# Patient Record
(demographics unavailable — no encounter records)

---

## 2025-03-14 NOTE — PHYSICAL EXAM
[Alert] : alert [Normal Voice/Communication] : normal voice/communication [Healthy Appearing] : healthy appearing [No Acute Distress] : no acute distress [Sclera] : the sclera and conjunctiva were normal [Hearing Threshold Finger Rub Not [place]] : hearing was normal [Normal Lips/Gums] : the lips and gums were normal [Oropharynx] : the oropharynx was normal [Normal Appearance] : the appearance of the neck was normal [No Neck Mass] : no neck mass was observed [No Respiratory Distress] : no respiratory distress [No Acc Muscle Use] : no accessory muscle use [Respiration, Rhythm And Depth] : normal respiratory rhythm and effort [Auscultation Breath Sounds / Voice Sounds] : lungs were clear to auscultation bilaterally [Heart Rate And Rhythm] : heart rate was normal and rhythm regular [Normal S1, S2] : normal S1 and S2 [Murmurs] : no murmurs [Bowel Sounds] : normal bowel sounds [Abdomen Tenderness] : non-tender [No Masses] : no abdominal mass palpated [Abdomen Soft] : soft [] : no hepatosplenomegaly [Oriented To Time, Place, And Person] : oriented to person, place, and time [de-identified] : Well-developed well-nourished large frame and build.  No acute distress. [FreeTextEntry1] : Anicteric sclera. [de-identified] : Moist mucosa.  Mallampati #3. [de-identified] : Supple neck.  Short neck.  No adenopathy. [de-identified] : Clear. [de-identified] : Without murmurs rubs or gallops. [de-identified] : No CCE. [de-identified] : Mass of abdomen.  No HSM.  No MTR.  Normal bowel sounds. [de-identified] : Deferred. [de-identified] : Normal. [de-identified] : Normal. [de-identified] : Normal.

## 2025-03-14 NOTE — REASON FOR VISIT
[Consultation] : a consultation visit [FreeTextEntry1] : Gas borborygmi frequent bowel movements.  Cramps in upper abdomen.

## 2025-03-14 NOTE — ASSESSMENT
[FreeTextEntry1] : Dyspepsia, gas cramps and bloating possible lactose intolerance.  Positive family history of ulcers in mother.  Possible H. pylori infestation.  Symptoms do not suggest active peptic ulcer disease.  Unlikely to be biliary colic.  Loose frequent stools.  Possible IBD.  More likely IBS. Plan urea breath test Sonogram abdomen to assess for fatty liver and rule out gallstones. FibroScan to assess for hepatic steatosis and fibrosis. Stool studies should clearance C. difficile, GI PCR, fecal calprotectin. Blood work for CBC, CMP,'s ESR, CRP, IBD panel, [facility].  TSH with reflex. Vague abdominal discomfort.  Unclear etiology. Recommend CAT scan abdomen pelvis with oral and IV contrast. Hold on endoscopic procedures at this juncture.  Morbid obesity increases the risk of elective endoscopic procedures. Office follow-up here in 3 months.

## 2025-05-22 NOTE — ASSESSMENT
[FreeTextEntry1] : Morbid obesity with modest transaminitis and fatty liver on multiple scans.  Direct imaging studies including CT and ultrasound did not find cirrhosis of the liver.  FibroScan assessment may be an overestimation. No evidence for autoimmune disease.  Evidence for viral hepatitis.  Suspect that the majority of findings in liver result of alcohol use disorder and obesity.  Patient is currently abstinent from alcohol for the past 3 months and feeling fine.  No withdrawal symptoms.  Encouraged to maintain sobriety. Excellent response thus far to the use of Zepbound.  36 pound weight loss over 10 weeks.  No significant medication side effects to date. Will continue same.  Advised patient to return here in 1 year.  Vies patient to repeat blood work in 11 months to consist of CBC, CMP, alpha-fetoprotein.  Scan to be repeated in 11 months.  Both requested.  Arrangements made.  Call for problems.

## 2025-05-22 NOTE — REASON FOR VISIT
[Follow-up] : a follow-up of an existing diagnosis [FreeTextEntry1] : Morbid obesity on therapy.  Abnormal transaminases.  Fatty liver.  Questionable cirrhosis.

## 2025-05-22 NOTE — PHYSICAL EXAM
[Alert] : alert [Normal Voice/Communication] : normal voice/communication [Healthy Appearing] : healthy appearing [No Acute Distress] : no acute distress [Sclera] : the sclera and conjunctiva were normal [Hearing Threshold Finger Rub Not [place]] : hearing was normal [Normal Lips/Gums] : the lips and gums were normal [Oropharynx] : the oropharynx was normal [Normal Appearance] : the appearance of the neck was normal [No Neck Mass] : no neck mass was observed [No Respiratory Distress] : no respiratory distress [No Acc Muscle Use] : no accessory muscle use [Respiration, Rhythm And Depth] : normal respiratory rhythm and effort [Auscultation Breath Sounds / Voice Sounds] : lungs were clear to auscultation bilaterally [Heart Rate And Rhythm] : heart rate was normal and rhythm regular [Normal S1, S2] : normal S1 and S2 [Murmurs] : no murmurs [Bowel Sounds] : normal bowel sounds [Abdomen Tenderness] : non-tender [No Masses] : no abdominal mass palpated [Abdomen Soft] : soft [] : no hepatosplenomegaly [Oriented To Time, Place, And Person] : oriented to person, place, and time [de-identified] : Well-developed well-nourished large frame and build.  No acute distress. [FreeTextEntry1] : Anicteric sclera. [de-identified] : Moist mucosa.  Mallampati #3. [de-identified] : Supple neck.  Short neck.  No adenopathy. [de-identified] : Clear. [de-identified] : Without murmurs rubs or gallops. [de-identified] : No CCE. [de-identified] : Mass of abdomen.  No HSM.  No MTR.  Normal bowel sounds. [de-identified] : Deferred. [de-identified] : Normal. [de-identified] : Normal. [de-identified] : Normal.